# Patient Record
Sex: FEMALE | Race: WHITE
[De-identification: names, ages, dates, MRNs, and addresses within clinical notes are randomized per-mention and may not be internally consistent; named-entity substitution may affect disease eponyms.]

---

## 2017-12-28 PROBLEM — Z00.00 ENCOUNTER FOR PREVENTIVE HEALTH EXAMINATION: Status: ACTIVE | Noted: 2017-12-28

## 2019-02-12 ENCOUNTER — APPOINTMENT (OUTPATIENT)
Dept: GASTROENTEROLOGY | Facility: CLINIC | Age: 59
End: 2019-02-12
Payer: MEDICARE

## 2019-02-12 VITALS
BODY MASS INDEX: 23 KG/M2 | HEIGHT: 62 IN | HEART RATE: 82 BPM | DIASTOLIC BLOOD PRESSURE: 81 MMHG | RESPIRATION RATE: 16 BRPM | SYSTOLIC BLOOD PRESSURE: 124 MMHG | OXYGEN SATURATION: 98 % | WEIGHT: 125 LBS

## 2019-02-12 PROCEDURE — 99204 OFFICE O/P NEW MOD 45 MIN: CPT

## 2019-02-12 RX ORDER — DILTIAZEM HYDROCHLORIDE 120 MG/1
120 CAPSULE, COATED, EXTENDED RELEASE ORAL
Refills: 0 | Status: ACTIVE | COMMUNITY

## 2019-02-12 NOTE — PHYSICAL EXAM
[General Appearance - Alert] : alert [General Appearance - In No Acute Distress] : in no acute distress [Sclera] : the sclera and conjunctiva were normal [PERRL With Normal Accommodation] : pupils were equal in size, round, and reactive to light [Extraocular Movements] : extraocular movements were intact [Outer Ear] : the ears and nose were normal in appearance [Oropharynx] : the oropharynx was normal [Neck Appearance] : the appearance of the neck was normal [Neck Cervical Mass (___cm)] : no neck mass was observed [Jugular Venous Distention Increased] : there was no jugular-venous distention [Thyroid Diffuse Enlargement] : the thyroid was not enlarged [Thyroid Nodule] : there were no palpable thyroid nodules [Auscultation Breath Sounds / Voice Sounds] : lungs were clear to auscultation bilaterally [Heart Rate And Rhythm] : heart rate was normal and rhythm regular [Heart Sounds] : normal S1 and S2 [Heart Sounds Gallop] : no gallops [Murmurs] : no murmurs [Heart Sounds Pericardial Friction Rub] : no pericardial rub [Edema] : there was no peripheral edema [Bowel Sounds] : normal bowel sounds [Abdomen Soft] : soft [Abdomen Tenderness] : non-tender [Abdomen Mass (___ Cm)] : no abdominal mass palpated [] : no rash [Oriented To Time, Place, And Person] : oriented to person, place, and time [Impaired Insight] : insight and judgment were intact [Affect] : the affect was normal

## 2019-02-12 NOTE — ASSESSMENT
[FreeTextEntry1] : I discussed with the patient that she seems to be getting better from drinking apple cider then it her. Although she was complaining of some burning in her mouth there were no lesions on her tongue or her oropharynx. I didn't feel she had some recurrent symptoms from reflux disease as she's had in the past. She also says that she might have eosinophilic esophagitis gastric to have her results of her endoscopy centimeter my review.In the interim I put her on pantoprazole 40 mg per day and she says that that medication worked for her in the past.\par \par In addition, the patient has never had colorectal cancer screening and should have a colonoscopy. She does have a history of SVT but supposedly under control with diltiazem but I asked her to check with her cardiologist as she was nervous about having any procedure performed, although she has had endoscopy performed under sedation. She did say she had a respiratory issue after one of her endoscopies but the other one was fine and had no issues with her heart. If she is cleared by cardiology, we will arrange for the colonoscopy

## 2022-06-09 NOTE — HISTORY OF PRESENT ILLNESS
[de-identified] : patient 3 weeks ago drank applecider vinegar  and afterwards developed diffuse burning in her mouth esophagus and stomach. Her symptoms a little better now. She does have a history of reflux and possible eosinophilic esophagitis and had been on PPIs in the past but stopped.She has no dysphagia or odynophagia or or significant abdominal pain or chest pain or shortness of breath or cough. Apparently she had 2 endoscopies by 2 different gastroenterologists and I last saw her in 2011 which showed eosinophilic esophagitis. She also states that she had erosive esophagitis. Glycopyrrolate Pregnancy And Lactation Text: This medication is Pregnancy Category B and is considered safe during pregnancy. It is unknown if it is excreted breast milk.

## 2022-11-15 ENCOUNTER — OFFICE (OUTPATIENT)
Dept: URBAN - METROPOLITAN AREA CLINIC 104 | Facility: CLINIC | Age: 62
Setting detail: OPHTHALMOLOGY
End: 2022-11-15
Payer: MEDICARE

## 2022-11-15 DIAGNOSIS — H01.004: ICD-10-CM

## 2022-11-15 DIAGNOSIS — H01.002: ICD-10-CM

## 2022-11-15 DIAGNOSIS — H00.15: ICD-10-CM

## 2022-11-15 DIAGNOSIS — H01.005: ICD-10-CM

## 2022-11-15 DIAGNOSIS — H04.123: ICD-10-CM

## 2022-11-15 DIAGNOSIS — H18.513: ICD-10-CM

## 2022-11-15 DIAGNOSIS — H01.001: ICD-10-CM

## 2022-11-15 PROCEDURE — 99213 OFFICE O/P EST LOW 20 MIN: CPT | Performed by: SPECIALIST

## 2022-11-15 ASSESSMENT — TEAR BREAK UP TIME (TBUT)
OD_TBUT: 1+ 2+
OS_TBUT: 1+ 2+

## 2022-11-15 ASSESSMENT — LID POSITION - COMMENTS
OD_COMMENTS: PSEUDO PTOSIS
OS_COMMENTS: PSEUDO PTOSIS

## 2022-11-15 ASSESSMENT — VISUAL ACUITY
OS_BCVA: 20/25
OD_BCVA: 20/25

## 2022-11-15 ASSESSMENT — LID EXAM ASSESSMENTS
OD_BLEPHARITIS: RLL RUL 2+
OS_BLEPHARITIS: LLL LUL 1+ 2+

## 2022-11-15 ASSESSMENT — CONFRONTATIONAL VISUAL FIELD TEST (CVF)
OD_FINDINGS: FULL
OS_FINDINGS: FULL

## 2022-11-15 ASSESSMENT — CORNEAL DYSTROPHY: OS_DYSTROPHY: AT 12 OCLOCK

## 2023-03-07 ENCOUNTER — OFFICE (OUTPATIENT)
Dept: URBAN - METROPOLITAN AREA CLINIC 104 | Facility: CLINIC | Age: 63
Setting detail: OPHTHALMOLOGY
End: 2023-03-07
Payer: MEDICARE

## 2023-03-07 DIAGNOSIS — H01.002: ICD-10-CM

## 2023-03-07 DIAGNOSIS — H01.004: ICD-10-CM

## 2023-03-07 DIAGNOSIS — H04.123: ICD-10-CM

## 2023-03-07 DIAGNOSIS — H18.513: ICD-10-CM

## 2023-03-07 DIAGNOSIS — H01.001: ICD-10-CM

## 2023-03-07 DIAGNOSIS — H01.005: ICD-10-CM

## 2023-03-07 PROCEDURE — 99213 OFFICE O/P EST LOW 20 MIN: CPT | Performed by: SPECIALIST

## 2023-03-07 ASSESSMENT — REFRACTION_AUTOREFRACTION
OD_CYLINDER: -0.25
OD_SPHERE: +2.25
OD_AXIS: 32
OS_CYLINDER: -0.50
OS_AXIS: 97
OS_SPHERE: +2.25

## 2023-03-07 ASSESSMENT — TEAR BREAK UP TIME (TBUT)
OD_TBUT: 2+
OS_TBUT: 2+

## 2023-03-07 ASSESSMENT — CORNEAL DYSTROPHY: OS_DYSTROPHY: AT 12 OCLOCK

## 2023-03-07 ASSESSMENT — KERATOMETRY
OD_K1POWER_DIOPTERS: 42.51
OD_AXISANGLE_DEGREES: 165
OS_AXISANGLE_DEGREES: 71
OS_K1POWER_DIOPTERS: 43.16
OS_K2POWER_DIOPTERS: 43.60
OD_K2POWER_DIOPTERS: 43.44

## 2023-03-07 ASSESSMENT — VISUAL ACUITY
OD_BCVA: 20/25
OS_BCVA: 20/25

## 2023-03-07 ASSESSMENT — AXIALLENGTH_DERIVED
OD_AL: 22.97
OS_AL: 22.88

## 2023-03-07 ASSESSMENT — REFRACTION_CURRENTRX
OS_SPHERE: +2.50
OD_OVR_VA: 20/
OS_OVR_VA: 20/
OD_SPHERE: +2.50

## 2023-03-07 ASSESSMENT — SPHEQUIV_DERIVED
OS_SPHEQUIV: 2
OD_SPHEQUIV: 2.125

## 2023-03-07 ASSESSMENT — TONOMETRY
OD_IOP_MMHG: 17
OS_IOP_MMHG: 17

## 2023-03-07 ASSESSMENT — LID EXAM ASSESSMENTS
OS_BLEPHARITIS: LLL LUL 1+ 2+
OD_BLEPHARITIS: RLL RUL 2+

## 2023-03-07 ASSESSMENT — LID POSITION - COMMENTS
OS_COMMENTS: PSEUDO PTOSIS
OD_COMMENTS: PSEUDO PTOSIS

## 2023-03-07 ASSESSMENT — CONFRONTATIONAL VISUAL FIELD TEST (CVF)
OS_FINDINGS: FULL
OD_FINDINGS: FULL

## 2023-04-12 ENCOUNTER — OFFICE (OUTPATIENT)
Dept: URBAN - METROPOLITAN AREA CLINIC 104 | Facility: CLINIC | Age: 63
Setting detail: OPHTHALMOLOGY
End: 2023-04-12
Payer: MEDICARE

## 2023-04-12 DIAGNOSIS — H43.812: ICD-10-CM

## 2023-04-12 DIAGNOSIS — H18.513: ICD-10-CM

## 2023-04-12 DIAGNOSIS — H01.005: ICD-10-CM

## 2023-04-12 DIAGNOSIS — H01.002: ICD-10-CM

## 2023-04-12 DIAGNOSIS — H16.223: ICD-10-CM

## 2023-04-12 DIAGNOSIS — H43.392: ICD-10-CM

## 2023-04-12 DIAGNOSIS — H25.813: ICD-10-CM

## 2023-04-12 DIAGNOSIS — H01.001: ICD-10-CM

## 2023-04-12 DIAGNOSIS — H25.13: ICD-10-CM

## 2023-04-12 DIAGNOSIS — H01.004: ICD-10-CM

## 2023-04-12 PROCEDURE — 99214 OFFICE O/P EST MOD 30 MIN: CPT | Performed by: OPTOMETRIST

## 2023-04-12 PROCEDURE — 92201 OPSCPY EXTND RTA DRAW UNI/BI: CPT | Performed by: OPTOMETRIST

## 2023-04-12 ASSESSMENT — REFRACTION_AUTOREFRACTION
OS_CYLINDER: -0.50
OD_AXIS: 32
OS_AXIS: 97
OD_SPHERE: +2.25
OS_SPHERE: +2.25
OD_CYLINDER: -0.25

## 2023-04-12 ASSESSMENT — REFRACTION_CURRENTRX
OD_OVR_VA: 20/
OS_OVR_VA: 20/
OS_SPHERE: +2.50
OD_SPHERE: +2.50

## 2023-04-12 ASSESSMENT — KERATOMETRY
OD_AXISANGLE_DEGREES: 165
OD_K2POWER_DIOPTERS: 43.44
OS_AXISANGLE_DEGREES: 71
OS_K2POWER_DIOPTERS: 43.60
OS_K1POWER_DIOPTERS: 43.16
OD_K1POWER_DIOPTERS: 42.51

## 2023-04-12 ASSESSMENT — LID EXAM ASSESSMENTS
OS_BLEPHARITIS: LLL LUL 1+ 2+
OD_BLEPHARITIS: RLL RUL 2+

## 2023-04-12 ASSESSMENT — AXIALLENGTH_DERIVED
OD_AL: 22.97
OS_AL: 22.88

## 2023-04-12 ASSESSMENT — SPHEQUIV_DERIVED
OS_SPHEQUIV: 2
OD_SPHEQUIV: 2.125

## 2023-04-12 ASSESSMENT — CORNEAL DYSTROPHY: OS_DYSTROPHY: AT 12 OCLOCK

## 2023-04-12 ASSESSMENT — LID POSITION - COMMENTS
OD_COMMENTS: PSEUDO PTOSIS
OS_COMMENTS: PSEUDO PTOSIS

## 2023-04-12 ASSESSMENT — VISUAL ACUITY
OS_BCVA: 20/25-2
OD_BCVA: 20/25-2

## 2023-04-12 ASSESSMENT — TEAR BREAK UP TIME (TBUT)
OD_TBUT: 2+
OS_TBUT: 2+

## 2023-04-12 ASSESSMENT — TONOMETRY
OD_IOP_MMHG: 17
OS_IOP_MMHG: 17

## 2023-04-14 ENCOUNTER — OFFICE (OUTPATIENT)
Dept: URBAN - METROPOLITAN AREA CLINIC 104 | Facility: CLINIC | Age: 63
Setting detail: OPHTHALMOLOGY
End: 2023-04-14
Payer: MEDICARE

## 2023-04-14 DIAGNOSIS — H43.392: ICD-10-CM

## 2023-04-14 PROBLEM — H25.813 CATARACT SENILE NUCLEAR SCLEROSIS; BOTH EYES: Status: ACTIVE | Noted: 2023-04-12

## 2023-04-14 PROBLEM — H25.13 CATARACT SENILE NUCLEAR SCLEROSIS; BOTH EYES: Status: ACTIVE | Noted: 2023-04-12

## 2023-04-14 PROBLEM — H43.812: Status: ACTIVE | Noted: 2023-04-12

## 2023-04-14 PROBLEM — H16.223 DRY EYE SYNDROME K SICCA; BOTH EYES: Status: ACTIVE | Noted: 2023-04-12

## 2023-04-14 PROCEDURE — 92014 COMPRE OPH EXAM EST PT 1/>: CPT | Performed by: SPECIALIST

## 2023-04-14 PROCEDURE — 92134 CPTRZ OPH DX IMG PST SGM RTA: CPT | Performed by: SPECIALIST

## 2023-04-14 ASSESSMENT — CONFRONTATIONAL VISUAL FIELD TEST (CVF)
OD_FINDINGS: FULL
OS_FINDINGS: FULL

## 2023-04-14 ASSESSMENT — CORNEAL DYSTROPHY: OS_DYSTROPHY: AT 12 OCLOCK

## 2023-04-14 ASSESSMENT — LID POSITION - COMMENTS
OS_COMMENTS: PSEUDO PTOSIS
OD_COMMENTS: PSEUDO PTOSIS

## 2023-04-14 ASSESSMENT — TEAR BREAK UP TIME (TBUT)
OS_TBUT: 2+
OD_TBUT: 2+

## 2023-04-14 ASSESSMENT — VISUAL ACUITY
OD_BCVA: 20/25
OS_BCVA: 20/25

## 2023-04-14 ASSESSMENT — LID EXAM ASSESSMENTS
OD_BLEPHARITIS: RLL RUL 2+
OS_BLEPHARITIS: LLL LUL 1+ 2+

## 2023-05-26 ENCOUNTER — OFFICE (OUTPATIENT)
Dept: URBAN - METROPOLITAN AREA CLINIC 104 | Facility: CLINIC | Age: 63
Setting detail: OPHTHALMOLOGY
End: 2023-05-26
Payer: MEDICARE

## 2023-05-26 DIAGNOSIS — H43.812: ICD-10-CM

## 2023-05-26 DIAGNOSIS — H43.392: ICD-10-CM

## 2023-05-26 PROCEDURE — 92134 CPTRZ OPH DX IMG PST SGM RTA: CPT | Performed by: SPECIALIST

## 2023-05-26 PROCEDURE — 92012 INTRM OPH EXAM EST PATIENT: CPT | Performed by: SPECIALIST

## 2023-05-26 ASSESSMENT — VISUAL ACUITY
OD_BCVA: 20/30-2
OS_BCVA: 20/25

## 2023-05-26 ASSESSMENT — CORNEAL DYSTROPHY: OS_DYSTROPHY: AT 12 OCLOCK

## 2023-05-26 ASSESSMENT — LID POSITION - COMMENTS
OD_COMMENTS: PSEUDO PTOSIS
OS_COMMENTS: PSEUDO PTOSIS

## 2023-05-26 ASSESSMENT — LID EXAM ASSESSMENTS
OS_BLEPHARITIS: LLL LUL 1+ 2+
OD_BLEPHARITIS: RLL RUL 2+

## 2023-05-26 ASSESSMENT — TEAR BREAK UP TIME (TBUT)
OS_TBUT: 2+
OD_TBUT: 2+

## 2023-07-07 ENCOUNTER — OFFICE (OUTPATIENT)
Dept: URBAN - METROPOLITAN AREA CLINIC 104 | Facility: CLINIC | Age: 63
Setting detail: OPHTHALMOLOGY
End: 2023-07-07
Payer: MEDICARE

## 2023-07-07 DIAGNOSIS — H00.14: ICD-10-CM

## 2023-07-07 PROCEDURE — 99213 OFFICE O/P EST LOW 20 MIN: CPT | Performed by: OPTOMETRIST

## 2023-07-07 ASSESSMENT — LID POSITION - COMMENTS
OS_COMMENTS: PSEUDO PTOSIS
OD_COMMENTS: PSEUDO PTOSIS

## 2023-07-07 ASSESSMENT — VISUAL ACUITY
OS_BCVA: 20/25-
OD_BCVA: 20/25

## 2023-07-07 ASSESSMENT — CONFRONTATIONAL VISUAL FIELD TEST (CVF)
OD_FINDINGS: FULL
OS_FINDINGS: FULL

## 2023-07-07 ASSESSMENT — LID EXAM ASSESSMENTS
OD_BLEPHARITIS: RLL RUL 2+
OS_BLEPHARITIS: LLL LUL 1+ 2+

## 2023-07-07 ASSESSMENT — CORNEAL DYSTROPHY: OS_DYSTROPHY: AT 12 OCLOCK

## 2023-07-07 ASSESSMENT — TEAR BREAK UP TIME (TBUT)
OS_TBUT: 2+
OD_TBUT: 2+

## 2023-07-07 ASSESSMENT — TONOMETRY: OS_IOP_MMHG: 16

## 2023-07-12 ENCOUNTER — OFFICE (OUTPATIENT)
Dept: URBAN - METROPOLITAN AREA CLINIC 104 | Facility: CLINIC | Age: 63
Setting detail: OPHTHALMOLOGY
End: 2023-07-12
Payer: MEDICARE

## 2023-07-12 DIAGNOSIS — H00.14: ICD-10-CM

## 2023-07-12 PROCEDURE — 99213 OFFICE O/P EST LOW 20 MIN: CPT | Performed by: OPTOMETRIST

## 2023-07-12 ASSESSMENT — TEAR BREAK UP TIME (TBUT)
OD_TBUT: 2+
OS_TBUT: 2+

## 2023-07-12 ASSESSMENT — VISUAL ACUITY
OD_BCVA: 20/25+2
OS_BCVA: 20/20

## 2023-07-12 ASSESSMENT — LID EXAM ASSESSMENTS
OD_BLEPHARITIS: RLL RUL 2+
OS_BLEPHARITIS: LLL LUL 1+ 2+

## 2023-07-12 ASSESSMENT — CORNEAL DYSTROPHY: OS_DYSTROPHY: AT 12 OCLOCK

## 2023-07-12 ASSESSMENT — LID POSITION - COMMENTS
OD_COMMENTS: PSEUDO PTOSIS
OS_COMMENTS: PSEUDO PTOSIS

## 2023-07-12 ASSESSMENT — TONOMETRY: OS_IOP_MMHG: 16

## 2023-07-18 ENCOUNTER — APPOINTMENT (OUTPATIENT)
Dept: GASTROENTEROLOGY | Facility: CLINIC | Age: 63
End: 2023-07-18
Payer: MEDICARE

## 2023-07-18 VITALS
SYSTOLIC BLOOD PRESSURE: 107 MMHG | DIASTOLIC BLOOD PRESSURE: 60 MMHG | TEMPERATURE: 96.8 F | RESPIRATION RATE: 16 BRPM | OXYGEN SATURATION: 98 % | HEART RATE: 81 BPM | BODY MASS INDEX: 22.08 KG/M2 | WEIGHT: 120 LBS | HEIGHT: 62 IN

## 2023-07-18 DIAGNOSIS — Z87.39 PERSONAL HISTORY OF OTHER DISEASES OF THE MUSCULOSKELETAL SYSTEM AND CONNECTIVE TISSUE: ICD-10-CM

## 2023-07-18 DIAGNOSIS — Z87.09 PERSONAL HISTORY OF OTHER DISEASES OF THE RESPIRATORY SYSTEM: ICD-10-CM

## 2023-07-18 DIAGNOSIS — Z12.11 ENCOUNTER FOR SCREENING FOR MALIGNANT NEOPLASM OF COLON: ICD-10-CM

## 2023-07-18 DIAGNOSIS — K80.20 CALCULUS OF GALLBLADDER W/OUT CHOLECYSTITIS W/OUT OBSTRUCTION: ICD-10-CM

## 2023-07-18 DIAGNOSIS — Z86.79 PERSONAL HISTORY OF OTHER DISEASES OF THE CIRCULATORY SYSTEM: ICD-10-CM

## 2023-07-18 PROCEDURE — 99204 OFFICE O/P NEW MOD 45 MIN: CPT

## 2023-07-18 RX ORDER — ALPRAZOLAM 2 MG/1
2 TABLET, EXTENDED RELEASE ORAL
Refills: 0 | Status: DISCONTINUED | COMMUNITY
End: 2023-07-18

## 2023-07-18 RX ORDER — ALPRAZOLAM 0.5 MG/1
0.5 TABLET ORAL
Refills: 0 | Status: ACTIVE | COMMUNITY

## 2023-07-18 RX ORDER — PANTOPRAZOLE 40 MG/1
40 TABLET, DELAYED RELEASE ORAL
Qty: 30 | Refills: 1 | Status: DISCONTINUED | COMMUNITY
Start: 2019-02-12 | End: 2023-07-18

## 2023-07-18 NOTE — HISTORY OF PRESENT ILLNESS
[FreeTextEntry1] : About 2 months ago the patient ate an egg which she felt was contaminated and subsequently developed severe lower abdominal cramping and diarrhea which gradually resolved over 48 hours.  Ever since that time she has had constipation with a hard small daily bowel movement as well as occasional bloating and intermittent milder lower abdominal cramping.  There is no fever, nausea or vomiting.  If she takes Benefiber on a daily basis the constipation essentially resolves.  She also has a history of chronic gastroesophageal reflux which manifest as the sensation of gastroesophageal regurgitation without heartburn on most occasions.  She last underwent an upper endoscopy about 4 years ago by Dr. Perez apparently revealed eosinophilic esophagitis.  She does note occasional dysphagia to solids for many years that occurs if she eats quickly and does not chew well.  The bolus always passes.  She has been prescribed pantoprazole in the past which she does not feel was effective.  I do not have the results of that endoscopy for my review.  She apparently had issues with the propofol sedation that were respiratory in nature with difficulty breathing after the procedure.  As result he is very hesitant to undergo any further procedures and is also allergic to IV contrast.  There is no rectal bleeding or melena.  There is no family history of colon cancer.  She was seen by Dr. Nogueira in 2019 and it was recommended that she undergo a colonoscopy but never followed up with him.  She has never undergone a colonoscopy as well.

## 2023-07-18 NOTE — PHYSICAL EXAM

## 2023-07-18 NOTE — ASSESSMENT
[FreeTextEntry1] : In all probability she has some type of infectious colitis or enterocolitis which has since resolved.  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  Diverticulitis should be excluded.  I also recommended that if the CAT scan is negative she should undergo a colonoscopy but she continues to be hesitant to proceed with an endoscopy or colonoscopy.  She will therefore undergo a CAT scan of the abdomen and pelvis without contrast.  She will also obtain a CBC, basic metabolic profile, thyroid function test, C-reactive protein and liver function tests.  I have recommended that she take Benefiber 1 heaping tablespoon in 6 to 8 ounces of fluid on a daily basis.  She will be seen again in 2 months for further follow-up.  In the meantime I will also try to obtain the results of her prior endoscopy for my review.

## 2023-07-18 NOTE — REASON FOR VISIT
[Consultation] : a consultation visit [FreeTextEntry1] : constipation with occasional cramping as well history of chronic GERD

## 2023-08-02 ENCOUNTER — OFFICE (OUTPATIENT)
Dept: URBAN - METROPOLITAN AREA CLINIC 104 | Facility: CLINIC | Age: 63
Setting detail: OPHTHALMOLOGY
End: 2023-08-02
Payer: MEDICARE

## 2023-08-02 DIAGNOSIS — H00.14: ICD-10-CM

## 2023-08-02 PROCEDURE — 99213 OFFICE O/P EST LOW 20 MIN: CPT | Performed by: OPTOMETRIST

## 2023-08-02 ASSESSMENT — TEAR BREAK UP TIME (TBUT)
OD_TBUT: 2+
OS_TBUT: 2+

## 2023-08-02 ASSESSMENT — VISUAL ACUITY
OS_BCVA: 20/20
OD_BCVA: 20/25+2

## 2023-08-02 ASSESSMENT — TONOMETRY
OD_IOP_MMHG: 16
OS_IOP_MMHG: 16

## 2023-08-02 ASSESSMENT — CORNEAL DYSTROPHY: OS_DYSTROPHY: AT 12 OCLOCK

## 2023-08-02 ASSESSMENT — LID POSITION - COMMENTS
OD_COMMENTS: PSEUDO PTOSIS
OS_COMMENTS: PSEUDO PTOSIS

## 2023-08-02 ASSESSMENT — LID EXAM ASSESSMENTS
OS_BLEPHARITIS: LLL LUL 1+ 2+
OD_BLEPHARITIS: RLL RUL 2+

## 2023-08-02 ASSESSMENT — CONFRONTATIONAL VISUAL FIELD TEST (CVF)
OD_FINDINGS: FULL
OS_FINDINGS: FULL

## 2023-09-08 ENCOUNTER — OFFICE (OUTPATIENT)
Dept: URBAN - METROPOLITAN AREA CLINIC 104 | Facility: CLINIC | Age: 63
Setting detail: OPHTHALMOLOGY
End: 2023-09-08
Payer: MEDICARE

## 2023-09-08 DIAGNOSIS — H43.812: ICD-10-CM

## 2023-09-08 DIAGNOSIS — H43.392: ICD-10-CM

## 2023-09-08 PROCEDURE — 92014 COMPRE OPH EXAM EST PT 1/>: CPT | Performed by: SPECIALIST

## 2023-09-08 PROCEDURE — 92134 CPTRZ OPH DX IMG PST SGM RTA: CPT | Performed by: SPECIALIST

## 2023-09-08 ASSESSMENT — LID POSITION - COMMENTS
OD_COMMENTS: PSEUDO PTOSIS
OS_COMMENTS: PSEUDO PTOSIS

## 2023-09-08 ASSESSMENT — TEAR BREAK UP TIME (TBUT)
OD_TBUT: 2+
OS_TBUT: 2+

## 2023-09-08 ASSESSMENT — CONFRONTATIONAL VISUAL FIELD TEST (CVF)
OS_FINDINGS: FULL
OD_FINDINGS: FULL

## 2023-09-08 ASSESSMENT — LID EXAM ASSESSMENTS
OS_BLEPHARITIS: LLL LUL 1+ 2+
OD_BLEPHARITIS: RLL RUL 2+

## 2023-09-08 ASSESSMENT — VISUAL ACUITY
OD_BCVA: 20/25+2
OS_BCVA: 20/20

## 2023-09-08 ASSESSMENT — CORNEAL DYSTROPHY: OS_DYSTROPHY: AT 12 OCLOCK

## 2023-09-27 ENCOUNTER — APPOINTMENT (OUTPATIENT)
Dept: GASTROENTEROLOGY | Facility: CLINIC | Age: 63
End: 2023-09-27
Payer: MEDICARE

## 2023-09-27 VITALS
BODY MASS INDEX: 22.08 KG/M2 | DIASTOLIC BLOOD PRESSURE: 60 MMHG | WEIGHT: 120 LBS | HEART RATE: 85 BPM | OXYGEN SATURATION: 98 % | HEIGHT: 62 IN | RESPIRATION RATE: 16 BRPM | SYSTOLIC BLOOD PRESSURE: 103 MMHG | TEMPERATURE: 97.6 F

## 2023-09-27 DIAGNOSIS — K59.00 CONSTIPATION, UNSPECIFIED: ICD-10-CM

## 2023-09-27 DIAGNOSIS — R10.31 RIGHT LOWER QUADRANT PAIN: ICD-10-CM

## 2023-09-27 DIAGNOSIS — R10.32 RIGHT LOWER QUADRANT PAIN: ICD-10-CM

## 2023-09-27 DIAGNOSIS — K21.9 GASTRO-ESOPHAGEAL REFLUX DISEASE W/OUT ESOPHAGITIS: ICD-10-CM

## 2023-09-27 PROCEDURE — 99214 OFFICE O/P EST MOD 30 MIN: CPT

## 2023-12-27 ENCOUNTER — OFFICE (OUTPATIENT)
Dept: URBAN - METROPOLITAN AREA CLINIC 114 | Facility: CLINIC | Age: 63
Setting detail: OPHTHALMOLOGY
End: 2023-12-27
Payer: MEDICARE

## 2023-12-27 ENCOUNTER — RX ONLY (RX ONLY)
Age: 63
End: 2023-12-27

## 2023-12-27 DIAGNOSIS — H43.813: ICD-10-CM

## 2023-12-27 DIAGNOSIS — H01.001: ICD-10-CM

## 2023-12-27 DIAGNOSIS — H18.513: ICD-10-CM

## 2023-12-27 DIAGNOSIS — H01.005: ICD-10-CM

## 2023-12-27 DIAGNOSIS — H01.004: ICD-10-CM

## 2023-12-27 DIAGNOSIS — H43.392: ICD-10-CM

## 2023-12-27 DIAGNOSIS — H25.13: ICD-10-CM

## 2023-12-27 DIAGNOSIS — H01.002: ICD-10-CM

## 2023-12-27 DIAGNOSIS — H16.223: ICD-10-CM

## 2023-12-27 PROCEDURE — 92014 COMPRE OPH EXAM EST PT 1/>: CPT | Performed by: SPECIALIST

## 2023-12-27 ASSESSMENT — LID EXAM ASSESSMENTS
OD_BLEPHARITIS: RLL RUL 2+
OS_BLEPHARITIS: LLL LUL 1+ 2+

## 2023-12-27 ASSESSMENT — LID POSITION - COMMENTS
OD_COMMENTS: PSEUDO PTOSIS
OS_COMMENTS: PSEUDO PTOSIS

## 2023-12-27 ASSESSMENT — TEAR BREAK UP TIME (TBUT)
OS_TBUT: 2+
OD_TBUT: 2+

## 2023-12-27 ASSESSMENT — CORNEAL DYSTROPHY: OS_DYSTROPHY: AT 12 OCLOCK

## 2023-12-27 ASSESSMENT — CONFRONTATIONAL VISUAL FIELD TEST (CVF)
OS_FINDINGS: FULL
OD_FINDINGS: FULL

## 2024-01-12 ENCOUNTER — OFFICE (OUTPATIENT)
Dept: URBAN - METROPOLITAN AREA CLINIC 104 | Facility: CLINIC | Age: 64
Setting detail: OPHTHALMOLOGY
End: 2024-01-12
Payer: MEDICARE

## 2024-01-12 DIAGNOSIS — H43.813: ICD-10-CM

## 2024-01-12 DIAGNOSIS — H43.392: ICD-10-CM

## 2024-01-12 PROCEDURE — 92134 CPTRZ OPH DX IMG PST SGM RTA: CPT | Performed by: SPECIALIST

## 2024-01-12 ASSESSMENT — TEAR BREAK UP TIME (TBUT)
OD_TBUT: 2+
OS_TBUT: 2+

## 2024-01-12 ASSESSMENT — LID EXAM ASSESSMENTS
OS_BLEPHARITIS: LLL LUL 1+ 2+
OD_BLEPHARITIS: RLL RUL 2+

## 2024-01-12 ASSESSMENT — CONFRONTATIONAL VISUAL FIELD TEST (CVF)
OD_FINDINGS: FULL
OS_FINDINGS: FULL

## 2024-01-12 ASSESSMENT — LID POSITION - COMMENTS
OS_COMMENTS: PSEUDO PTOSIS
OD_COMMENTS: PSEUDO PTOSIS

## 2024-01-12 ASSESSMENT — CORNEAL DYSTROPHY: OS_DYSTROPHY: AT 12 OCLOCK

## 2024-01-16 ENCOUNTER — OFFICE (OUTPATIENT)
Dept: URBAN - METROPOLITAN AREA CLINIC 104 | Facility: CLINIC | Age: 64
Setting detail: OPHTHALMOLOGY
End: 2024-01-16
Payer: MEDICARE

## 2024-01-16 DIAGNOSIS — H01.004: ICD-10-CM

## 2024-01-16 DIAGNOSIS — H01.005: ICD-10-CM

## 2024-01-16 DIAGNOSIS — H25.13: ICD-10-CM

## 2024-01-16 DIAGNOSIS — H01.001: ICD-10-CM

## 2024-01-16 DIAGNOSIS — H18.513: ICD-10-CM

## 2024-01-16 DIAGNOSIS — H01.002: ICD-10-CM

## 2024-01-16 DIAGNOSIS — H16.223: ICD-10-CM

## 2024-01-16 DIAGNOSIS — H43.392: ICD-10-CM

## 2024-01-16 DIAGNOSIS — H43.813: ICD-10-CM

## 2024-01-16 PROCEDURE — 99213 OFFICE O/P EST LOW 20 MIN: CPT | Performed by: SPECIALIST

## 2024-01-16 PROCEDURE — 92015 DETERMINE REFRACTIVE STATE: CPT | Performed by: SPECIALIST

## 2024-01-16 ASSESSMENT — LID EXAM ASSESSMENTS
OS_BLEPHARITIS: LLL LUL 1+ 2+
OD_BLEPHARITIS: RLL RUL 2+

## 2024-01-16 ASSESSMENT — TEAR BREAK UP TIME (TBUT)
OS_TBUT: 2+
OD_TBUT: 2+

## 2024-01-16 ASSESSMENT — LID POSITION - COMMENTS
OD_COMMENTS: PSEUDO PTOSIS
OS_COMMENTS: PSEUDO PTOSIS

## 2024-01-16 ASSESSMENT — CONFRONTATIONAL VISUAL FIELD TEST (CVF)
OS_FINDINGS: FULL
OD_FINDINGS: FULL

## 2024-01-16 ASSESSMENT — CORNEAL DYSTROPHY: OS_DYSTROPHY: AT 12 OCLOCK

## 2024-01-16 ASSESSMENT — REFRACTION_CURRENTRX
OD_CYLINDER: -0.25
OD_SPHERE: +2.75
OD_OVR_VA: 20/
OS_VPRISM_DIRECTION: SV
OS_SPHERE: +2.50
OD_VPRISM_DIRECTION: SV
OS_OVR_VA: 20/
OD_AXIS: 24

## 2024-01-16 ASSESSMENT — REFRACTION_MANIFEST
OS_SPHERE: +1.25
OS_VA1: 20/25+
OD_VA1: 20/20
OD_SPHERE: +1.25

## 2024-05-14 ENCOUNTER — OFFICE (OUTPATIENT)
Dept: URBAN - METROPOLITAN AREA CLINIC 104 | Facility: CLINIC | Age: 64
Setting detail: OPHTHALMOLOGY
End: 2024-05-14
Payer: MEDICARE

## 2024-05-14 DIAGNOSIS — H01.004: ICD-10-CM

## 2024-05-14 DIAGNOSIS — H43.392: ICD-10-CM

## 2024-05-14 DIAGNOSIS — H43.813: ICD-10-CM

## 2024-05-14 DIAGNOSIS — H25.13: ICD-10-CM

## 2024-05-14 DIAGNOSIS — H01.001: ICD-10-CM

## 2024-05-14 DIAGNOSIS — H01.005: ICD-10-CM

## 2024-05-14 DIAGNOSIS — H18.513: ICD-10-CM

## 2024-05-14 DIAGNOSIS — H16.223: ICD-10-CM

## 2024-05-14 DIAGNOSIS — H01.002: ICD-10-CM

## 2024-05-14 PROCEDURE — 99213 OFFICE O/P EST LOW 20 MIN: CPT | Performed by: SPECIALIST

## 2024-05-14 ASSESSMENT — LID POSITION - COMMENTS
OD_COMMENTS: PSEUDO PTOSIS
OS_COMMENTS: PSEUDO PTOSIS

## 2024-05-14 ASSESSMENT — LID EXAM ASSESSMENTS
OD_BLEPHARITIS: RLL RUL 2+
OS_BLEPHARITIS: LLL LUL 1+ 2+

## 2024-05-14 ASSESSMENT — CONFRONTATIONAL VISUAL FIELD TEST (CVF)
OS_FINDINGS: FULL
OD_FINDINGS: FULL

## 2024-10-25 ENCOUNTER — APPOINTMENT (OUTPATIENT)
Dept: GASTROENTEROLOGY | Facility: CLINIC | Age: 64
End: 2024-10-25
Payer: MEDICARE

## 2024-10-25 VITALS
OXYGEN SATURATION: 97 % | HEART RATE: 99 BPM | TEMPERATURE: 97.5 F | DIASTOLIC BLOOD PRESSURE: 86 MMHG | WEIGHT: 120 LBS | BODY MASS INDEX: 22.08 KG/M2 | RESPIRATION RATE: 14 BRPM | SYSTOLIC BLOOD PRESSURE: 111 MMHG | HEIGHT: 62 IN

## 2024-10-25 DIAGNOSIS — R10.13 EPIGASTRIC PAIN: ICD-10-CM

## 2024-10-25 DIAGNOSIS — I47.10 SUPRAVENTRICULAR TACHYCARDIA, UNSPECIFIED: ICD-10-CM

## 2024-10-25 DIAGNOSIS — R11.0 NAUSEA: ICD-10-CM

## 2024-10-25 DIAGNOSIS — Z71.9 COUNSELING, UNSPECIFIED: ICD-10-CM

## 2024-10-25 DIAGNOSIS — K21.9 GASTRO-ESOPHAGEAL REFLUX DISEASE W/OUT ESOPHAGITIS: ICD-10-CM

## 2024-10-25 DIAGNOSIS — R68.81 EARLY SATIETY: ICD-10-CM

## 2024-10-25 PROCEDURE — 99214 OFFICE O/P EST MOD 30 MIN: CPT

## 2025-04-29 ENCOUNTER — OFFICE (OUTPATIENT)
Dept: URBAN - METROPOLITAN AREA CLINIC 104 | Facility: CLINIC | Age: 65
Setting detail: OPHTHALMOLOGY
End: 2025-04-29
Payer: MEDICARE

## 2025-04-29 ENCOUNTER — RX ONLY (RX ONLY)
Age: 65
End: 2025-04-29

## 2025-04-29 DIAGNOSIS — H16.223: ICD-10-CM

## 2025-04-29 DIAGNOSIS — H01.004: ICD-10-CM

## 2025-04-29 DIAGNOSIS — B88.0: ICD-10-CM

## 2025-04-29 DIAGNOSIS — H01.001: ICD-10-CM

## 2025-04-29 DIAGNOSIS — H00.14: ICD-10-CM

## 2025-04-29 DIAGNOSIS — H01.002: ICD-10-CM

## 2025-04-29 DIAGNOSIS — H01.005: ICD-10-CM

## 2025-04-29 PROCEDURE — 99213 OFFICE O/P EST LOW 20 MIN: CPT | Performed by: OPTOMETRIST

## 2025-04-29 ASSESSMENT — TEAR BREAK UP TIME (TBUT)
OD_TBUT: 2+
OS_TBUT: 2+

## 2025-04-29 ASSESSMENT — LID EXAM ASSESSMENTS
OD_BLEPHARITIS: RLL RUL 2+
OS_BLEPHARITIS: LLL LUL 1+ 2+

## 2025-04-29 ASSESSMENT — CONFRONTATIONAL VISUAL FIELD TEST (CVF)
OS_FINDINGS: FULL
OD_FINDINGS: FULL

## 2025-04-29 ASSESSMENT — LID POSITION - COMMENTS
OS_COMMENTS: PSEUDO PTOSIS
OD_COMMENTS: PSEUDO PTOSIS

## 2025-04-29 ASSESSMENT — VISUAL ACUITY
OD_BCVA: 20/40
OS_BCVA: 20/40

## 2025-04-29 ASSESSMENT — CORNEAL DYSTROPHY: OS_DYSTROPHY: AT 12 OCLOCK

## 2025-05-20 ENCOUNTER — OFFICE (OUTPATIENT)
Dept: URBAN - METROPOLITAN AREA CLINIC 104 | Facility: CLINIC | Age: 65
Setting detail: OPHTHALMOLOGY
End: 2025-05-20
Payer: MEDICARE

## 2025-05-20 DIAGNOSIS — H01.004: ICD-10-CM

## 2025-05-20 DIAGNOSIS — H00.14: ICD-10-CM

## 2025-05-20 DIAGNOSIS — H16.223: ICD-10-CM

## 2025-05-20 DIAGNOSIS — H01.002: ICD-10-CM

## 2025-05-20 DIAGNOSIS — B88.0: ICD-10-CM

## 2025-05-20 DIAGNOSIS — H01.005: ICD-10-CM

## 2025-05-20 DIAGNOSIS — H01.001: ICD-10-CM

## 2025-05-20 PROCEDURE — 99213 OFFICE O/P EST LOW 20 MIN: CPT | Performed by: SPECIALIST

## 2025-05-20 ASSESSMENT — LID EXAM ASSESSMENTS
OD_BLEPHARITIS: RLL RUL 2+
OS_BLEPHARITIS: LLL LUL 1+ 2+

## 2025-05-20 ASSESSMENT — LID POSITION - COMMENTS
OS_COMMENTS: PSEUDO PTOSIS
OD_COMMENTS: PSEUDO PTOSIS

## 2025-05-20 ASSESSMENT — CONFRONTATIONAL VISUAL FIELD TEST (CVF)
OD_FINDINGS: FULL
OS_FINDINGS: FULL

## 2025-05-20 ASSESSMENT — VISUAL ACUITY
OD_BCVA: 20/40
OS_BCVA: 20/40

## 2025-05-20 ASSESSMENT — TONOMETRY: OS_IOP_MMHG: 15

## 2025-05-20 ASSESSMENT — TEAR BREAK UP TIME (TBUT)
OS_TBUT: 2+
OD_TBUT: 2+

## 2025-05-20 ASSESSMENT — CORNEAL DYSTROPHY: OS_DYSTROPHY: AT 12 OCLOCK

## 2025-07-22 ENCOUNTER — OFFICE (OUTPATIENT)
Dept: URBAN - METROPOLITAN AREA CLINIC 104 | Facility: CLINIC | Age: 65
Setting detail: OPHTHALMOLOGY
End: 2025-07-22
Payer: MEDICARE

## 2025-07-22 ENCOUNTER — RX ONLY (RX ONLY)
Age: 65
End: 2025-07-22

## 2025-07-22 DIAGNOSIS — H01.001: ICD-10-CM

## 2025-07-22 DIAGNOSIS — H01.005: ICD-10-CM

## 2025-07-22 DIAGNOSIS — H01.004: ICD-10-CM

## 2025-07-22 DIAGNOSIS — H16.223: ICD-10-CM

## 2025-07-22 DIAGNOSIS — H01.002: ICD-10-CM

## 2025-07-22 DIAGNOSIS — H18.513: ICD-10-CM

## 2025-07-22 DIAGNOSIS — H43.392: ICD-10-CM

## 2025-07-22 DIAGNOSIS — B88.0: ICD-10-CM

## 2025-07-22 DIAGNOSIS — H43.813: ICD-10-CM

## 2025-07-22 DIAGNOSIS — H25.13: ICD-10-CM

## 2025-07-22 PROCEDURE — 92014 COMPRE OPH EXAM EST PT 1/>: CPT | Performed by: SPECIALIST

## 2025-07-22 ASSESSMENT — TONOMETRY
OD_IOP_MMHG: 16
OS_IOP_MMHG: 16

## 2025-07-22 ASSESSMENT — LID POSITION - COMMENTS
OS_COMMENTS: PSEUDO PTOSIS
OD_COMMENTS: PSEUDO PTOSIS

## 2025-07-22 ASSESSMENT — REFRACTION_AUTOREFRACTION
OS_CYLINDER: -0.75
OS_SPHERE: +2.75
OD_AXIS: 84
OD_CYLINDER: -0.75
OS_AXIS: 79
OD_SPHERE: +2.50

## 2025-07-22 ASSESSMENT — REFRACTION_CURRENTRX
OS_SPHERE: +2.50
OD_SPHERE: +2.50
OD_OVR_VA: 20/
OS_OVR_VA: 20/

## 2025-07-22 ASSESSMENT — TEAR BREAK UP TIME (TBUT)
OS_TBUT: 2+
OD_TBUT: 2+

## 2025-07-22 ASSESSMENT — LID EXAM ASSESSMENTS
OD_BLEPHARITIS: RLL RUL 2+
OS_BLEPHARITIS: LLL LUL 1+ 2+

## 2025-07-22 ASSESSMENT — VISUAL ACUITY
OD_BCVA: 20/25
OS_BCVA: 20/30

## 2025-07-22 ASSESSMENT — KERATOMETRY
OD_K1POWER_DIOPTERS: 42.67
OD_K2POWER_DIOPTERS: 43.44
OS_K2POWER_DIOPTERS: 43.60
OD_AXISANGLE_DEGREES: 158
OS_K1POWER_DIOPTERS: 43.44
OS_AXISANGLE_DEGREES: 59

## 2025-07-22 ASSESSMENT — CONFRONTATIONAL VISUAL FIELD TEST (CVF)
OD_FINDINGS: FULL
OS_FINDINGS: FULL

## 2025-07-22 ASSESSMENT — CORNEAL DYSTROPHY: OS_DYSTROPHY: AT 12 OCLOCK
